# Patient Record
Sex: MALE | Race: BLACK OR AFRICAN AMERICAN | NOT HISPANIC OR LATINO | URBAN - NONMETROPOLITAN AREA
[De-identification: names, ages, dates, MRNs, and addresses within clinical notes are randomized per-mention and may not be internally consistent; named-entity substitution may affect disease eponyms.]

---

## 2019-01-11 ENCOUNTER — IMPORTED ENCOUNTER (OUTPATIENT)
Dept: URBAN - NONMETROPOLITAN AREA CLINIC 1 | Facility: CLINIC | Age: 69
End: 2019-01-11

## 2019-01-11 PROBLEM — H25.813: Noted: 2019-01-11

## 2019-01-11 PROBLEM — H52.223: Noted: 2019-01-11

## 2019-01-11 PROCEDURE — 92004 COMPRE OPH EXAM NEW PT 1/>: CPT

## 2019-01-11 NOTE — PATIENT DISCUSSION
Cataract OU-Visually significant cataract OU.-Cataract(s) causing symptomatic impairment of visual function not correctable with a tolerable change in glasses or contact lenses lighting or non-operative means resulting in specific activity limitations and/or participation restrictions including but not limited to reading viewing television driving or meeting vocational or recreational needs. -Expectation is clearer vision and functional improvement in symptoms as well as reduced glare disability after cataract removal.-Order IOLMaster and OPD today. -Recommend LRI OD and Toric IOL OS based on today's OPD testing and lifestyle questionnaire.-All questions were answered regarding surgery including pre and post-op medications appointments activity restrictions and anesthetic usage.-The risks benefits and alternatives and special risk factors for the patient were discussed in detail including but not limited to: bleeding infection retinal detachment vitreous loss problems with the implant and possible need for additional surgery.-Although rare the possibility of complete vision loss was discussed.-The possible need for glasses post-operatively was discussed.-Order H&P -Patient elects to proceed with cataract surgery OD. Will schedule at patient's convenience and re-evaluate OS  in the future. ***Len[employer] discussed Recommend LRI OD and Toric OS.** patient elects LRI OD and Toric OS with Len[employer]

## 2019-02-19 ENCOUNTER — IMPORTED ENCOUNTER (OUTPATIENT)
Dept: URBAN - NONMETROPOLITAN AREA CLINIC 1 | Facility: CLINIC | Age: 69
End: 2019-02-19

## 2019-02-19 PROBLEM — H52.223: Noted: 2019-02-19

## 2019-02-19 PROBLEM — H25.813: Noted: 2019-02-19

## 2019-03-07 ENCOUNTER — IMPORTED ENCOUNTER (OUTPATIENT)
Dept: URBAN - NONMETROPOLITAN AREA CLINIC 1 | Facility: CLINIC | Age: 69
End: 2019-03-07

## 2019-03-07 PROBLEM — Z01.818: Noted: 2019-03-07

## 2022-04-09 ASSESSMENT — TONOMETRY
OS_IOP_MMHG: 15
OD_IOP_MMHG: 15

## 2022-04-09 ASSESSMENT — VISUAL ACUITY
OS_SC: 20/20
OD_GLARE: 20/50
OS_GLARE: 20/50
OD_SC: 20/25
OD_PAM: 20/20
OS_AM: 20/20